# Patient Record
Sex: MALE | Race: BLACK OR AFRICAN AMERICAN | NOT HISPANIC OR LATINO | Employment: FULL TIME | URBAN - METROPOLITAN AREA
[De-identification: names, ages, dates, MRNs, and addresses within clinical notes are randomized per-mention and may not be internally consistent; named-entity substitution may affect disease eponyms.]

---

## 2020-01-07 ENCOUNTER — OFFICE VISIT (OUTPATIENT)
Dept: UROLOGY | Facility: CLINIC | Age: 65
End: 2020-01-07
Payer: COMMERCIAL

## 2020-01-07 VITALS
RESPIRATION RATE: 19 BRPM | WEIGHT: 148 LBS | DIASTOLIC BLOOD PRESSURE: 83 MMHG | BODY MASS INDEX: 23.78 KG/M2 | HEART RATE: 70 BPM | HEIGHT: 66 IN | SYSTOLIC BLOOD PRESSURE: 154 MMHG

## 2020-01-07 DIAGNOSIS — R97.20 ELEVATED PSA: Primary | ICD-10-CM

## 2020-01-07 DIAGNOSIS — N40.0 BPH WITHOUT OBSTRUCTION/LOWER URINARY TRACT SYMPTOMS: ICD-10-CM

## 2020-01-07 PROCEDURE — 99204 PR OFFICE/OUTPT VISIT, NEW, LEVL IV, 45-59 MIN: ICD-10-PCS | Mod: 25,S$GLB,, | Performed by: NURSE PRACTITIONER

## 2020-01-07 PROCEDURE — 36415 PR COLLECTION VENOUS BLOOD,VENIPUNCTURE: ICD-10-PCS | Mod: S$GLB,,, | Performed by: NURSE PRACTITIONER

## 2020-01-07 PROCEDURE — 99204 OFFICE O/P NEW MOD 45 MIN: CPT | Mod: 25,S$GLB,, | Performed by: NURSE PRACTITIONER

## 2020-01-07 PROCEDURE — 36415 COLL VENOUS BLD VENIPUNCTURE: CPT | Mod: S$GLB,,, | Performed by: NURSE PRACTITIONER

## 2020-01-07 NOTE — PROGRESS NOTES
Chief Complaint:   Chief Complaint   Patient presents with    Other     Elevated [facility]       HPI:  64-year-old  male [facility].  Upon review of medical record PSA 11.41 noted recently, previous value of 1.98 in March 2006.  Patient states that he did not follow up with primary care for quite some time.  He denies any lower urinary tract symptoms such as urinary hesitancy, urgency, frequency, incomplete emptying, or burning with urination.  He is not currently on any prostate medications, has never taken any medications in the past such as alpha blockers.  He denies a family history of prostate cancer.  He declines TERESA today as one was recently performed by his primary care provider.  Upon review of medical records the prostate was enlarged, without any palpable nodules, without tenderness.    Of note, patient did report intermittent episodes of dizziness that he experiences at work that he attributes to dehydration as he does not eat breakfast in the morning. No current symptoms so patient educated and instructed to follow-up with PCP for management.    Allergies:  Patient has no known allergies.    Medications:  currently has no medications in their medication list.    Review of Systems:  General: No fever, chills, vision changes, dizziness, weakness, fatigue, unexplained weight loss, confusion, or mood swings.  Skin: No rashes, itching, or changes in color/texture of skin.  Chest: Denies CARPENTER, cyanosis, wheezing, cough, and sputum production.  Abdomen: Appetite fine. Denies diarrhea, abdominal pain, hematemesis, or blood in stool.  Musculoskeletal: No joint stiffness or swelling. Denies back pain.  : As above.  All other review of systems negative.    PMH:   has a past medical history of Anemia, Diabetes mellitus, and Hyperlipidemia.    PSH:   has a past surgical history that includes Finger amputation.    FamHx: family history is not on  "file.    SocHx:  reports that he has never smoked. He has never used smokeless tobacco. He reports that he drank alcohol. He reports that he has current or past drug history. Drugs: "Crack" cocaine and Marijuana.      Physical Exam:  Vitals:    01/07/20 1551   BP: (!) 154/83   Pulse: 70   Resp: 19     General: AAOx3, no apparent distress, no deformities  Neck: supple, no masses, normal thyroid  Lungs: normal inspiration, no use of accessory muscles  Heart: regular rate and rhythm, no arrhythmias  Abdomen: soft, NT, ND, no masses, no hernias, no hepatosplenomegaly  Lymphatic: no unusually enlarged or tender lymph nodes  Skin: warm and dry, no jaundice.  Ext: without edema or deformity.  : declined TERESA    Labs/Studies: PSA levels reviewed as above    Impression/Plan:   Elevated PSA  Comments:  PSA 11.41 on labs noted from PCP (previous value 1.98 in 3/2006- no follow up for some time), TERESA benign from PCP (declined repeat), PSA drawn & will call   Orders:  -     PSA, total and free    BPH without obstruction/lower urinary tract symptoms  Comments:  prostate enlargement noted on TERESA by PCP, pt denies LUTS, no prostate meds         Follow up in about 3 months (around 4/7/2020).  "

## 2020-01-08 LAB
PROSTATE SPECIFIC ANTIGEN, TOTAL: 14.2 NG/ML (ref 0.1–4)
PSA FREE MFR SERPL: 6.6 %
PSA FREE SERPL-MCNC: 0.94 NG/ML

## 2020-01-09 ENCOUNTER — TELEPHONE (OUTPATIENT)
Dept: UROLOGY | Facility: CLINIC | Age: 65
End: 2020-01-09

## 2020-01-09 DIAGNOSIS — R97.20 ELEVATED PSA: Primary | ICD-10-CM

## 2020-01-09 NOTE — TELEPHONE ENCOUNTER
PSA 14.20 6.6%free    Please call patient and let him know that I am recommending prostate biopsy as we discussed at his appointment

## 2020-01-14 ENCOUNTER — TELEPHONE (OUTPATIENT)
Dept: UROLOGY | Facility: CLINIC | Age: 65
End: 2020-01-14

## 2020-01-14 NOTE — TELEPHONE ENCOUNTER
----- Message from Delphine Newsome sent at 1/13/2020  1:18 PM CST -----  I called pt to set up bx for pt, we set it up but he told me that no one called  Him with his bloodwork results.

## 2020-01-24 ENCOUNTER — CLINICAL SUPPORT (OUTPATIENT)
Dept: UROLOGY | Facility: CLINIC | Age: 65
End: 2020-01-24
Payer: COMMERCIAL

## 2020-01-24 DIAGNOSIS — R97.20 ELEVATED PSA: Primary | ICD-10-CM

## 2020-02-03 ENCOUNTER — PROCEDURE VISIT (OUTPATIENT)
Dept: UROLOGY | Facility: CLINIC | Age: 65
End: 2020-02-03
Payer: COMMERCIAL

## 2020-02-03 VITALS
DIASTOLIC BLOOD PRESSURE: 55 MMHG | HEART RATE: 66 BPM | BODY MASS INDEX: 22.82 KG/M2 | WEIGHT: 142 LBS | SYSTOLIC BLOOD PRESSURE: 103 MMHG | HEIGHT: 66 IN | RESPIRATION RATE: 16 BRPM | OXYGEN SATURATION: 97 %

## 2020-02-03 DIAGNOSIS — R97.20 ELEVATED PSA: ICD-10-CM

## 2020-02-03 PROCEDURE — 76872 US TRANSRECTAL: CPT | Mod: 26,S$GLB,, | Performed by: SPECIALIST

## 2020-02-03 PROCEDURE — 96372 PR INJECTION,THERAP/PROPH/DIAG2ST, IM OR SUBCUT: ICD-10-PCS | Mod: 59,S$GLB,, | Performed by: SPECIALIST

## 2020-02-03 PROCEDURE — 55700 TRUS: ICD-10-PCS | Mod: S$GLB,,, | Performed by: SPECIALIST

## 2020-02-03 PROCEDURE — 76872 TRUS: ICD-10-PCS | Mod: 26,S$GLB,, | Performed by: SPECIALIST

## 2020-02-03 PROCEDURE — 96372 THER/PROPH/DIAG INJ SC/IM: CPT | Mod: 59,S$GLB,, | Performed by: SPECIALIST

## 2020-02-03 PROCEDURE — 55700 TRUS: CPT | Mod: S$GLB,,, | Performed by: SPECIALIST

## 2020-02-03 RX ORDER — MEPERIDINE HYDROCHLORIDE 25 MG/ML
25 INJECTION INTRAMUSCULAR; INTRAVENOUS; SUBCUTANEOUS
Status: COMPLETED | OUTPATIENT
Start: 2020-02-03 | End: 2020-02-03

## 2020-02-03 RX ORDER — PROMETHAZINE HYDROCHLORIDE 25 MG/ML
25 INJECTION, SOLUTION INTRAMUSCULAR; INTRAVENOUS
Status: COMPLETED | OUTPATIENT
Start: 2020-02-03 | End: 2020-02-03

## 2020-02-03 RX ORDER — CEFTRIAXONE 1 G/1
1 INJECTION, POWDER, FOR SOLUTION INTRAMUSCULAR; INTRAVENOUS
Status: COMPLETED | OUTPATIENT
Start: 2020-02-03 | End: 2020-02-03

## 2020-02-03 RX ORDER — DIAZEPAM 10 MG/1
10 TABLET ORAL
Status: CANCELLED | OUTPATIENT
Start: 2020-02-03 | End: 2020-02-03

## 2020-02-03 RX ORDER — DIAZEPAM 10 MG/1
10 TABLET ORAL
Status: COMPLETED | OUTPATIENT
Start: 2020-02-03 | End: 2020-02-03

## 2020-02-03 RX ADMIN — MEPERIDINE HYDROCHLORIDE 25 MG: 25 INJECTION INTRAMUSCULAR; INTRAVENOUS; SUBCUTANEOUS at 11:02

## 2020-02-03 RX ADMIN — CEFTRIAXONE 1 G: 1 INJECTION, POWDER, FOR SOLUTION INTRAMUSCULAR; INTRAVENOUS at 11:02

## 2020-02-03 RX ADMIN — DIAZEPAM 10 MG: 10 TABLET ORAL at 11:02

## 2020-02-03 RX ADMIN — PROMETHAZINE HYDROCHLORIDE 25 MG: 25 INJECTION, SOLUTION INTRAMUSCULAR; INTRAVENOUS at 11:02

## 2020-02-03 NOTE — PATIENT INSTRUCTIONS

## 2020-02-03 NOTE — PROCEDURES
"TRUS  Date/Time: 2/3/2020 11:20 AM  Performed by: Anthony Figueroa MD  Authorized by: Anthony Figueroa MD     Consent Done?:  Yes (Written)  Time out: Immediately prior to procedure a "time out" was called to verify the correct patient, procedure, equipment, support staff and site/side marked as required.    Indications: Elevated PSA    Preparation: Patient was prepped and draped in usual sterile fashion    Position:  Left lateral  Anesthesia:  10cc's 1% Lidocaine  Patient sedated: Yes    Sedation:  Other  Prostate Size:  50.02 g  Lesions:: Yes         Type:  Hypoechoic  Left Base Biopsies: 2  Left Mid Biopsies: 2  Left [place]ushan Biopsies: 2  Right Base Biopsies: 2  Right Mid Biopsies: 2  Right Shushan Biopsies: 2  Transitional zone: No    Total Biopsies:  12    Patient tolerance:  Patient tolerated the procedure well with no immediate complications     Patient was given post biopsy instructions.  He was instructed of the possibility of blood in the urine blood in the stool blood in the semen for up to 6-8 weeks.  He has been instructed to watch out for any flu-like symptoms malaise fatigue etc.  Patient has been notified to give us a call if he has any temperatures.  We should see him in clinic in 2 weeks for post biopsy results.        "

## 2020-02-17 ENCOUNTER — TELEPHONE (OUTPATIENT)
Dept: UROLOGY | Facility: CLINIC | Age: 65
End: 2020-02-17

## 2020-02-17 NOTE — TELEPHONE ENCOUNTER
----- Message from Blake Dubose sent at 2/17/2020  9:42 AM CST -----  Contact: Pt  Please call Kemal regarding his reimbursement paperwork [phone] (home).

## 2020-02-28 ENCOUNTER — OFFICE VISIT (OUTPATIENT)
Dept: UROLOGY | Facility: CLINIC | Age: 65
End: 2020-02-28
Payer: COMMERCIAL

## 2020-02-28 VITALS — WEIGHT: 142 LBS | RESPIRATION RATE: 18 BRPM | BODY MASS INDEX: 22.82 KG/M2 | HEIGHT: 66 IN

## 2020-02-28 DIAGNOSIS — C61 PROSTATE CANCER: Primary | ICD-10-CM

## 2020-02-28 PROCEDURE — 99213 PR OFFICE/OUTPT VISIT, EST, LEVL III, 20-29 MIN: ICD-10-PCS | Mod: S$GLB,,, | Performed by: SPECIALIST

## 2020-02-28 PROCEDURE — 99213 OFFICE O/P EST LOW 20 MIN: CPT | Mod: S$GLB,,, | Performed by: SPECIALIST

## 2020-02-28 NOTE — PROGRESS NOTES
"Subjective:       Patient ID: Kemal Chu is a 65 y.o. male.    Chief Complaint: Other (follow up for prostate biopsy results)      HPI:  65-year-old man who underwent a needle biopsy for elevated PSA done about 2 weeks ago.  He is here to discuss his results.    Denies any side effects from the biopsies no blood in the urine or blood in the semen no blood in the stool.  Voids to completion denies any burning with urination.  Empties his bladder very well.    Past Medical History:   Past Medical History:   Diagnosis Date    Anemia     Diabetes mellitus     Hyperlipidemia        Past Surgical Historical:   Past Surgical History:   Procedure Laterality Date    FINGER AMPUTATION          Medications:      Past Social History:   Social History     Socioeconomic History    Marital status:      Spouse name: Not on file    Number of children: Not on file    Years of education: Not on file    Highest education level: Not on file   Occupational History    Occupation:    Social Needs    Financial resource strain: Not on file    Food insecurity:     Worry: Not on file     Inability: Not on file    Transportation needs:     Medical: Not on file     Non-medical: Not on file   Tobacco Use    Smoking status: Never Smoker    Smokeless tobacco: Never Used   Substance and Sexual Activity    Alcohol use: Not Currently     Frequency: Never     Comment: quit     Drug use: Not Currently     Types: "Crack" cocaine, Marijuana     Comment: quit 2004    Sexual activity: Not on file   Lifestyle    Physical activity:     Days per week: Not on file     Minutes per session: Not on file    Stress: Not on file   Relationships    Social connections:     Talks on phone: Not on file     Gets together: Not on file     Attends Sabianism service: Not on file     Active member of club or organization: Not on file     Attends meetings of clubs or organizations: Not on file     Relationship status: Not on file "   Other Topics Concern    Not on file   Social History Narrative    Not on file       Allergies: Review of patient's allergies indicates:  No Known Allergies     Family History: History reviewed. No pertinent family history.     Review of Systems:   systems reviewed and notable for prostate cancer  All other systems were reviewed Neg except as stated in the HPI    Physical Exam:  General: A&Ox3. No apparent distress. No deformities.  Neck: No masses. Normal thyroid.  Lungs: normal inspiration. No use of accessory muscles.  Heart: normal pulse. No arrhythmias.  Abdomen: Soft. NT. ND. No masses. No hernias. No hepatosplenomegaly.  Lymphatic: Neck and groin nodes negative.  Skin: The skin is warm and dry. No jaundice.  Neurology: Cranial nerves 2-12 crossly intact, no focal weaknesses, no sensation deficits, no motor deficits  Ext: No clubbing, cyanosis or edema.  :  Deferred    Pathology review:  10 cores reported as benign prostatic tissue.  The left base was reported with Ranjit 3 + 3 equals 6  0.5 mm focus 3% of the core and the left lateral mid was reported a Ranjit 3 + 3 equals 6  0.5 mm focus and 4% of the core    Assessment/Plan:       65-year-old man with newly diagnosed low risk low volume adenocarcinoma of the prostate initial PSA 14.20 ng/mL.    1.  His calculated PSA density is 0 0.28 which is of course greater than 0.15.  The as up with his diagnosis.  2.  I talked to him about the natural progression of prostate cancer.  At this point he has low volume low risk disease.  Two cores were reported positive Ranjit 3 + 3 equals 6 with 0.5 mm foci in the the core and 3% on 4% respectively.  He has elected to undergo active surveillance after we explored all the treatment options.  3.  Return to clinic in 6 months for PSA check and he will need a repeat biopsy 1 year from his previous biopsy    Problem List Items Addressed This Visit        Oncology    Prostate cancer - Primary

## 2021-03-26 ENCOUNTER — OFFICE VISIT (OUTPATIENT)
Dept: UROLOGY | Facility: CLINIC | Age: 66
End: 2021-03-26
Payer: OTHER GOVERNMENT

## 2021-03-26 VITALS — DIASTOLIC BLOOD PRESSURE: 79 MMHG | HEART RATE: 67 BPM | SYSTOLIC BLOOD PRESSURE: 133 MMHG

## 2021-03-26 DIAGNOSIS — C61 PROSTATE CANCER: Primary | ICD-10-CM

## 2021-03-26 DIAGNOSIS — N40.0 BPH WITHOUT OBSTRUCTION/LOWER URINARY TRACT SYMPTOMS: ICD-10-CM

## 2021-03-26 LAB — PSA, DIAGNOSTIC: 17.8 NG/ML (ref 0–4)

## 2021-03-26 PROCEDURE — 99213 PR OFFICE/OUTPT VISIT, EST, LEVL III, 20-29 MIN: ICD-10-PCS | Mod: S$GLB,,, | Performed by: NURSE PRACTITIONER

## 2021-03-26 PROCEDURE — 36415 COLL VENOUS BLD VENIPUNCTURE: CPT | Mod: S$GLB,,, | Performed by: NURSE PRACTITIONER

## 2021-03-26 PROCEDURE — 36415 PR COLLECTION VENOUS BLOOD,VENIPUNCTURE: ICD-10-PCS | Mod: S$GLB,,, | Performed by: NURSE PRACTITIONER

## 2021-03-26 PROCEDURE — 99213 OFFICE O/P EST LOW 20 MIN: CPT | Mod: S$GLB,,, | Performed by: NURSE PRACTITIONER

## 2021-03-26 RX ORDER — NAPROXEN SODIUM 220 MG/1
TABLET, FILM COATED ORAL
COMMUNITY
Start: 2021-01-13 | End: 2022-12-09 | Stop reason: SDUPTHER

## 2021-03-26 RX ORDER — METFORMIN HYDROCHLORIDE 500 MG/1
TABLET, EXTENDED RELEASE ORAL
COMMUNITY
Start: 2021-01-13

## 2021-03-26 RX ORDER — LISINOPRIL 2.5 MG/1
TABLET ORAL
COMMUNITY
Start: 2021-01-12 | End: 2022-12-09

## 2021-04-20 ENCOUNTER — CLINICAL SUPPORT (OUTPATIENT)
Dept: UROLOGY | Facility: CLINIC | Age: 66
End: 2021-04-20
Payer: OTHER GOVERNMENT

## 2021-04-28 ENCOUNTER — TELEPHONE (OUTPATIENT)
Dept: UROLOGY | Facility: CLINIC | Age: 66
End: 2021-04-28

## 2021-04-29 ENCOUNTER — PROCEDURE VISIT (OUTPATIENT)
Dept: UROLOGY | Facility: CLINIC | Age: 66
End: 2021-04-29
Payer: OTHER GOVERNMENT

## 2021-04-29 VITALS
RESPIRATION RATE: 18 BRPM | WEIGHT: 125.63 LBS | DIASTOLIC BLOOD PRESSURE: 65 MMHG | HEART RATE: 87 BPM | HEIGHT: 66 IN | SYSTOLIC BLOOD PRESSURE: 145 MMHG | BODY MASS INDEX: 20.19 KG/M2 | OXYGEN SATURATION: 98 %

## 2021-04-29 DIAGNOSIS — C61 PROSTATE CANCER: Primary | ICD-10-CM

## 2021-04-29 PROCEDURE — 55700 PR BIOPSY OF PROSTATE,NEEDLE/PUNCH: CPT | Mod: S$GLB,,, | Performed by: SPECIALIST

## 2021-04-29 PROCEDURE — 76872 PR US TRANSRECTAL: ICD-10-PCS | Mod: 26,S$GLB,, | Performed by: SPECIALIST

## 2021-04-29 PROCEDURE — 96372 THER/PROPH/DIAG INJ SC/IM: CPT | Mod: 59,S$GLB,, | Performed by: SPECIALIST

## 2021-04-29 PROCEDURE — 96372 PR INJECTION,THERAP/PROPH/DIAG2ST, IM OR SUBCUT: ICD-10-PCS | Mod: 59,S$GLB,, | Performed by: SPECIALIST

## 2021-04-29 PROCEDURE — 76872 US TRANSRECTAL: CPT | Mod: 26,S$GLB,, | Performed by: SPECIALIST

## 2021-04-29 PROCEDURE — 55700 PR BIOPSY OF PROSTATE,NEEDLE/PUNCH: ICD-10-PCS | Mod: S$GLB,,, | Performed by: SPECIALIST

## 2021-04-29 RX ORDER — CEFTRIAXONE 500 MG/1
1000 INJECTION, POWDER, FOR SOLUTION INTRAMUSCULAR; INTRAVENOUS
Status: COMPLETED | OUTPATIENT
Start: 2021-04-29 | End: 2021-04-29

## 2021-04-29 RX ORDER — MEPERIDINE HYDROCHLORIDE 25 MG/ML
25 INJECTION INTRAMUSCULAR; INTRAVENOUS; SUBCUTANEOUS
Status: CANCELLED | OUTPATIENT
Start: 2021-04-29 | End: 2021-04-29

## 2021-04-29 RX ORDER — PROMETHAZINE HYDROCHLORIDE 25 MG/ML
25 INJECTION, SOLUTION INTRAMUSCULAR; INTRAVENOUS
Status: CANCELLED | OUTPATIENT
Start: 2021-04-29 | End: 2021-04-29

## 2021-04-29 RX ORDER — GENTAMICIN SULFATE 40 MG/ML
80 INJECTION, SOLUTION INTRAMUSCULAR; INTRAVENOUS
Status: COMPLETED | OUTPATIENT
Start: 2021-04-29 | End: 2021-04-29

## 2021-04-29 RX ORDER — DIAZEPAM 10 MG/1
10 TABLET ORAL
Status: COMPLETED | OUTPATIENT
Start: 2021-04-29 | End: 2021-04-29

## 2021-04-29 RX ADMIN — CEFTRIAXONE 1000 MG: 500 INJECTION, POWDER, FOR SOLUTION INTRAMUSCULAR; INTRAVENOUS at 11:04

## 2021-04-29 RX ADMIN — GENTAMICIN SULFATE 80 MG: 40 INJECTION, SOLUTION INTRAMUSCULAR; INTRAVENOUS at 11:04

## 2021-04-29 RX ADMIN — DIAZEPAM 10 MG: 10 TABLET ORAL at 11:04

## 2021-05-13 ENCOUNTER — OFFICE VISIT (OUTPATIENT)
Dept: UROLOGY | Facility: CLINIC | Age: 66
End: 2021-05-13
Payer: OTHER GOVERNMENT

## 2021-05-13 VITALS
BODY MASS INDEX: 20.09 KG/M2 | HEIGHT: 66 IN | RESPIRATION RATE: 18 BRPM | DIASTOLIC BLOOD PRESSURE: 62 MMHG | WEIGHT: 125 LBS | HEART RATE: 68 BPM | SYSTOLIC BLOOD PRESSURE: 118 MMHG

## 2021-05-13 DIAGNOSIS — C61 PROSTATE CANCER: Primary | ICD-10-CM

## 2021-05-13 DIAGNOSIS — R97.20 ELEVATED PSA: ICD-10-CM

## 2021-05-13 PROCEDURE — 99213 OFFICE O/P EST LOW 20 MIN: CPT | Mod: S$GLB,,, | Performed by: SPECIALIST

## 2021-05-13 PROCEDURE — 99213 PR OFFICE/OUTPT VISIT, EST, LEVL III, 20-29 MIN: ICD-10-PCS | Mod: S$GLB,,, | Performed by: SPECIALIST

## 2022-10-04 ENCOUNTER — OFFICE VISIT (OUTPATIENT)
Dept: UROLOGY | Facility: CLINIC | Age: 67
End: 2022-10-04
Payer: OTHER GOVERNMENT

## 2022-10-04 VITALS
HEIGHT: 66 IN | HEART RATE: 61 BPM | WEIGHT: 132 LBS | TEMPERATURE: 98 F | DIASTOLIC BLOOD PRESSURE: 75 MMHG | RESPIRATION RATE: 18 BRPM | SYSTOLIC BLOOD PRESSURE: 130 MMHG | BODY MASS INDEX: 21.21 KG/M2

## 2022-10-04 DIAGNOSIS — C61 PROSTATE CANCER: Primary | ICD-10-CM

## 2022-10-04 PROCEDURE — 99215 OFFICE O/P EST HI 40 MIN: CPT | Mod: S$GLB,,, | Performed by: UROLOGY

## 2022-10-04 PROCEDURE — 99215 PR OFFICE/OUTPT VISIT, EST, LEVL V, 40-54 MIN: ICD-10-PCS | Mod: S$GLB,,, | Performed by: UROLOGY

## 2022-10-04 NOTE — PROGRESS NOTES
"Subjective:       Patient ID: Kemal Chu is a 67 y.o. male.    Chief Complaint: Prostate Cancer (Recent PSA 9/19/22 20.28)      HPI:  67-year-old male with prostate cancer initially low volume Ranjit 6 however he had a repeat PSA while on active surveillance as well as a repeat biopsy which revealed higher volume Ranjit 7 he was encouraged to proceed with treatment however he declined and has been lost to follow-up for nearly a year and a half he is here today his PSA is now 20 with a high volume Ranjit prostate biopsy 7 urine half ago    Past Medical History:   Past Medical History:   Diagnosis Date    Anemia     Diabetes mellitus     Hyperlipidemia     Hypertension     Prostate cancer        Past Surgical Historical:   Past Surgical History:   Procedure Laterality Date    FINGER AMPUTATION          Medications:   Medication List with Changes/Refills   Current Medications    ASPIRIN 81 MG CHEW        LISINOPRIL (PRINIVIL,ZESTRIL) 2.5 MG TABLET        METFORMIN (GLUCOPHAGE-XR) 500 MG ER 24HR TABLET            Past Social History:   Social History     Socioeconomic History    Marital status:    Occupational History    Occupation:    Tobacco Use    Smoking status: Never    Smokeless tobacco: Never   Substance and Sexual Activity    Alcohol use: Not Currently     Comment: quit     Drug use: Not Currently     Types: "Crack" cocaine, Marijuana     Comment: quit 2004       Allergies: Review of patient's allergies indicates:  No Known Allergies     Family History:   Family History   Problem Relation Age of Onset    No Known Problems Father     No Known Problems Mother         Review of Systems:  Review of Systems   Constitutional:  Negative for activity change and appetite change.   HENT:  Negative for congestion and dental problem.    Eyes:  Negative for visual disturbance.   Respiratory:  Negative for chest tightness and shortness of breath.    Cardiovascular:  Negative for chest pain. "   Gastrointestinal:  Negative for abdominal distention and abdominal pain.   Endocrine: Negative for polyuria.   Genitourinary:  Negative for difficulty urinating, dysuria, flank pain, frequency, hematuria, penile discharge, penile pain, penile swelling, scrotal swelling, testicular pain and urgency.   Musculoskeletal:  Negative for back pain and neck pain.   Skin:  Negative for color change.   Allergic/Immunologic: Positive for immunocompromised state.   Neurological:  Negative for dizziness.   Hematological:  Negative for adenopathy.   Psychiatric/Behavioral:  Negative for agitation, behavioral problems and confusion.      Physical Exam:  Physical Exam  Constitutional:       Appearance: He is well-developed.   HENT:      Head: Normocephalic and atraumatic.   Eyes:      Pupils: Pupils are equal, round, and reactive to light.   Cardiovascular:      Rate and Rhythm: Normal rate and regular rhythm.      Heart sounds: Normal heart sounds.   Pulmonary:      Effort: Pulmonary effort is normal. No respiratory distress.      Breath sounds: Normal breath sounds. No wheezing or rales.   Abdominal:      General: Bowel sounds are normal. There is no distension.      Palpations: Abdomen is soft.      Tenderness: There is no abdominal tenderness. There is no guarding or rebound.   Genitourinary:     Penis: Normal. No tenderness.       Prostate: Normal.   Musculoskeletal:         General: Normal range of motion.       Assessment/Plan:       Problem List Items Addressed This Visit          Oncology    Prostate cancer - Primary    Relevant Orders    Transrectal Ultrasound w/ Biopsy            Prostate cancer:   We had a very long and vishal discussion about the significance of his prostate cancer how if left unchecked or untreated he will likely die of this disease.  Patient appears to have an understanding of this but continues to seem not particularly alarmed and states he mostly does not want to do any thing to treated because he  does not feel like it or want to.  Again I told him in very plain terms that he is wetting the risk of choosing the dive prostate cancer patient has agreed to repeat a biopsy since it has been over year and a half since his last 1 in certainly we need to know what is going on now.  Will proceed with biopsy and hopefully curative treatment if this is possible

## 2022-10-18 ENCOUNTER — TELEPHONE (OUTPATIENT)
Dept: UROLOGY | Facility: CLINIC | Age: 67
End: 2022-10-18
Payer: OTHER GOVERNMENT

## 2022-10-24 ENCOUNTER — TELEPHONE (OUTPATIENT)
Dept: UROLOGY | Facility: CLINIC | Age: 67
End: 2022-10-24
Payer: OTHER GOVERNMENT

## 2022-10-24 DIAGNOSIS — R97.20 ELEVATED PROSTATE SPECIFIC ANTIGEN (PSA): Primary | ICD-10-CM

## 2022-11-22 DIAGNOSIS — C61 PROSTATE CANCER: Primary | ICD-10-CM

## 2022-11-22 RX ORDER — DIAZEPAM 10 MG/1
10 TABLET ORAL ONCE
Qty: 1 TABLET | Refills: 0 | Status: SHIPPED | OUTPATIENT
Start: 2022-11-22 | End: 2022-12-09

## 2022-11-22 RX ORDER — CIPROFLOXACIN 500 MG/1
500 TABLET ORAL 2 TIMES DAILY
Qty: 8 TABLET | Refills: 0 | Status: SHIPPED | OUTPATIENT
Start: 2022-11-22 | End: 2022-11-26

## 2022-11-23 ENCOUNTER — CLINICAL SUPPORT (OUTPATIENT)
Dept: UROLOGY | Facility: CLINIC | Age: 67
End: 2022-11-23
Payer: OTHER GOVERNMENT

## 2022-11-23 DIAGNOSIS — R97.20 ELEVATED PROSTATE SPECIFIC ANTIGEN (PSA): ICD-10-CM

## 2022-11-23 NOTE — PROGRESS NOTES
Pt here for prostate biopsy education.  All pre/post op information reviewed and discussed.  Questions answered and pt acknowledges understanding.  Consents signed. Cipro and Valium scripts given to patient. Encouraged to call with questions or concerns.

## 2022-12-09 ENCOUNTER — PROCEDURE VISIT (OUTPATIENT)
Dept: UROLOGY | Facility: CLINIC | Age: 67
End: 2022-12-09
Payer: OTHER GOVERNMENT

## 2022-12-09 VITALS
WEIGHT: 130 LBS | HEIGHT: 66 IN | HEART RATE: 64 BPM | OXYGEN SATURATION: 100 % | RESPIRATION RATE: 20 BRPM | DIASTOLIC BLOOD PRESSURE: 57 MMHG | SYSTOLIC BLOOD PRESSURE: 123 MMHG | BODY MASS INDEX: 20.89 KG/M2

## 2022-12-09 DIAGNOSIS — C61 PROSTATE CANCER: ICD-10-CM

## 2022-12-09 PROCEDURE — 76942 ECHO GUIDE FOR BIOPSY: CPT | Mod: S$GLB,,, | Performed by: UROLOGY

## 2022-12-09 PROCEDURE — 76942 TRANSRECTAL ULTRASOUND W/ BIOPSY: ICD-10-PCS | Mod: S$GLB,,, | Performed by: UROLOGY

## 2022-12-09 PROCEDURE — 55700 TRANSRECTAL ULTRASOUND W/ BIOPSY: CPT | Mod: S$GLB,,, | Performed by: UROLOGY

## 2022-12-09 PROCEDURE — 55700 TRANSRECTAL ULTRASOUND W/ BIOPSY: ICD-10-PCS | Mod: S$GLB,,, | Performed by: UROLOGY

## 2022-12-09 RX ORDER — NAPROXEN SODIUM 220 MG/1
81 TABLET, FILM COATED ORAL
COMMUNITY
Start: 2022-05-09

## 2022-12-09 NOTE — PROCEDURES
"Transrectal Ultrasound w/ Biopsy    Date/Time: 12/9/2022 10:00 AM  Performed by: Chaparro Reyes MD  Authorized by: Chaparro Reyes MD     Consent Done?:  Yes (Written)  Time out: Immediately prior to procedure a "time out" was called to verify the correct patient, procedure, equipment, support staff and site/side marked as required.    Indications: Elevated PSA    Position:  Left lateral  Anesthesia:  Pudendal nerve block  Patient sedated: No    Prostate Size:  32g  Left Base Biopsies: 2  Left Mid Biopsies: 2  Left [place] Biopsies: 2  Right Base Biopsies: 2  Right Mid Biopsies: 2  Right [place] Biopsies: 2  Total Biopsies:  12    Patient tolerance:  Patient tolerated the procedure well with no immediate complications     Patient was brought to the procedure room placed on the table in left lateral decubitus position lidocaine jelly was instilled into the rectum the ultrasound probe was advanced to level of prostate and a total of 10 cc of lidocaine was injected into the bilateral sharif prostatic fossa.  A standard 12 core prostate biopsy was obtained patient tolerated the procedure well there were no complications  "

## 2022-12-09 NOTE — PATIENT INSTRUCTIONS
NO STRENUOUS ACTIVITY FOR 3 DAYS  NO SEXUAL INTERCOURSE FOR 7 DAYS  YOU MAY NOTICE BLOOD IN URINE OR SEMEN FOR SEVERAL DAYS                What to Expect After a Prostate Biopsy    Please be sure to finish your pre-procedure antibiotics as instructed.    You may have mild bleeding from the rectum or urine for about 1 week to 1 month, or in your ejaculate for several months. This bleeding is normal and expected, and it will stop. You may have mild discomfort in your rectal or urethral area for 24-48 hours.    You cannot do any strenuous lifting, straining, or exercising for 24 hours. You may return to full activity the day after the biopsy.    You may continue to take all your regular medications after the procedure except for the blood thinners.    You may resume all blood-thinning medications once you no longer see any bleeding or whenever your physician prescribing the medication says it is all right to do so. You may take Tylenol if you have a fever and your temperature is less than 100° F or if you have some discomfort.    You will receive a call from the Urology Department at [facility] with the results of your prostate biopsy within one week.    Signs and Symptoms to Report    Call your [facility] urologist at [phone] if you develop any of the following:  Temperature greater than 101°  F  Inability to urinate  A large amount of bleeding from the rectum or in the urine  Persistent or severe pain    After hours or on weekends, you may reach a urology resident on call at this number: [phone].

## 2022-12-15 ENCOUNTER — OFFICE VISIT (OUTPATIENT)
Dept: UROLOGY | Facility: CLINIC | Age: 67
End: 2022-12-15
Payer: OTHER GOVERNMENT

## 2022-12-15 DIAGNOSIS — C61 PROSTATE CANCER: Primary | ICD-10-CM

## 2022-12-15 PROCEDURE — 99214 PR OFFICE/OUTPT VISIT, EST, LEVL IV, 30-39 MIN: ICD-10-PCS | Mod: S$GLB,,, | Performed by: UROLOGY

## 2022-12-15 PROCEDURE — 99214 OFFICE O/P EST MOD 30 MIN: CPT | Mod: S$GLB,,, | Performed by: UROLOGY

## 2022-12-15 NOTE — PROGRESS NOTES
"Subjective:       Patient ID: Kemal Chu is a 67 y.o. male.    Chief Complaint: 1 week bx results      HPI:  67-year-old male with known prostate cancer is followed with active surveillance he had an anniversary biopsy which revealed progression of disease see below.  He declined any intervention and was then lost to follow-up came back with a PSA around 20 and agreed to a repeat biopsy interestingly this only showed 2 cores of [place] 6 prostate cancer    There is evidence of progressive disease.  Patient has 3 cores of Ranjit 3+4=7 grade group 2, 5 mm focus 19% of the core in the left base, 9 mm focus, 24% of the core in the left apex and 3 mm focus, 10% of the core in the left lateral mid.  He also has Ranjit 3+3=6 grade group 1, 1 mm focus, 8% of the core in the left lateral base, 3 mm focus in 17% of the core in the left lateral apex, 1.5 mm focus and 15% of the core in the right lateral base.     Past Medical History:   Past Medical History:   Diagnosis Date    Anemia     Diabetes mellitus     Hyperlipidemia     Hypertension     Prostate cancer        Past Surgical Historical:   Past Surgical History:   Procedure Laterality Date    FINGER AMPUTATION          Medications:   Medication List with Changes/Refills   Current Medications    ASPIRIN 81 MG CHEW    81 mg.    METFORMIN (GLUCOPHAGE-XR) 500 MG ER 24HR TABLET            Past Social History:   Social History     Socioeconomic History    Marital status:    Occupational History    Occupation:    Tobacco Use    Smoking status: Never    Smokeless tobacco: Never   Substance and Sexual Activity    Alcohol use: Not Currently     Comment: quit     Drug use: Not Currently     Types: "Crack" cocaine, Marijuana     Comment: quit 2004       Allergies: Review of patient's allergies indicates:  No Known Allergies     Family History:   Family History   Problem Relation Age of Onset    No Known Problems Father     No Known Problems Mother     "     Review of Systems:  Review of Systems    Physical Exam:  Physical Exam    Assessment/Plan:       Problem List Items Addressed This Visit          Oncology    Prostate cancer - Primary            67-year-old male with Ranjit 7 prostate cancer I have again reinforced that I feel that the patient's seek curative therapy despite only having 2 cores [place] 6 on this most recent biopsy how long vishal discussion with him and the patient stated he is not interested at all in any surgical or radiation intervention ask the patient to think longer about it call us if he changes mind otherwise we will repeat his PSA in 3 months

## 2023-06-19 ENCOUNTER — CLINICAL SUPPORT (OUTPATIENT)
Dept: UROLOGY | Facility: CLINIC | Age: 68
End: 2023-06-19
Payer: OTHER GOVERNMENT

## 2023-06-19 DIAGNOSIS — C61 PROSTATE CANCER: Primary | ICD-10-CM

## 2023-06-19 LAB — PSA, DIAGNOSTIC: 27.9 NG/ML (ref 0.1–4)

## 2023-06-19 PROCEDURE — 36415 COLL VENOUS BLD VENIPUNCTURE: CPT | Mod: S$GLB,,, | Performed by: UROLOGY

## 2023-06-19 PROCEDURE — 36415 PR COLLECTION VENOUS BLOOD,VENIPUNCTURE: ICD-10-PCS | Mod: S$GLB,,, | Performed by: UROLOGY

## 2023-06-19 NOTE — PROGRESS NOTES
PSA level collected from left AC with 22g needle using aseptic technique. Patient tolerated well.

## 2023-06-22 ENCOUNTER — OFFICE VISIT (OUTPATIENT)
Dept: UROLOGY | Facility: CLINIC | Age: 68
End: 2023-06-22
Payer: OTHER GOVERNMENT

## 2023-06-22 VITALS
DIASTOLIC BLOOD PRESSURE: 78 MMHG | SYSTOLIC BLOOD PRESSURE: 157 MMHG | RESPIRATION RATE: 18 BRPM | WEIGHT: 130 LBS | HEART RATE: 65 BPM | HEIGHT: 66 IN | BODY MASS INDEX: 20.89 KG/M2

## 2023-06-22 DIAGNOSIS — C61 PROSTATE CANCER: Primary | ICD-10-CM

## 2023-06-22 PROCEDURE — 99214 PR OFFICE/OUTPT VISIT, EST, LEVL IV, 30-39 MIN: ICD-10-PCS | Mod: S$GLB,,, | Performed by: UROLOGY

## 2023-06-22 PROCEDURE — 99214 OFFICE O/P EST MOD 30 MIN: CPT | Mod: S$GLB,,, | Performed by: UROLOGY

## 2023-06-22 RX ORDER — ATORVASTATIN CALCIUM 20 MG/1
10 TABLET, FILM COATED ORAL
COMMUNITY
Start: 2023-03-27

## 2023-06-22 RX ORDER — LISINOPRIL 2.5 MG/1
TABLET ORAL
COMMUNITY
Start: 2023-03-28

## 2023-06-22 RX ORDER — ASCORBIC ACID 500 MG
500 TABLET ORAL
COMMUNITY
Start: 2023-03-27

## 2023-06-22 RX ORDER — SILDENAFIL 50 MG/1
TABLET, FILM COATED ORAL
COMMUNITY
Start: 2023-05-04

## 2023-06-22 NOTE — PROGRESS NOTES
"Subjective:       Patient ID: Kemal Chu is a 68 y.o. male.    Chief Complaint: Prostate Cancer      HPI:  68-year-old male who was initially followed for low risk prostate cancer who was on accident active surveillance he had a repeat biopsy back in the end of 2022 this showed progression of disease 3 cores of Ranjit 7 as well as a core of [place] 6 he was encouraged very strongly at that time that he needed curative treatment his PSA was 17.  He is here today his PSA most recently is now 27 he is here for discussion on his prostate cancer    Past Medical History:   Past Medical History:   Diagnosis Date    Anemia     Diabetes mellitus     Hyperlipidemia     Hypertension     Prostate cancer        Past Surgical Historical:   Past Surgical History:   Procedure Laterality Date    FINGER AMPUTATION          Medications:   Medication List with Changes/Refills   Current Medications    ASCORBIC ACID, VITAMIN C, (VITAMIN C) 500 MG TABLET    500 mg.    ASPIRIN 81 MG CHEW    81 mg.    ATORVASTATIN (LIPITOR) 20 MG TABLET    10 mg.    LISINOPRIL (PRINIVIL,ZESTRIL) 2.5 MG TABLET        METFORMIN (GLUCOPHAGE-XR) 500 MG ER 24HR TABLET        MULTIVIT WITH MINERALS/LUTEIN (MULTIVITAMIN 50 PLUS ORAL)    TAKE 1 TABLET BY MOUTH DAILY AS A VITAMIN SUPPLEMENT    SILDENAFIL (VIAGRA) 50 MG TABLET            Past Social History:   Social History     Socioeconomic History    Marital status:    Occupational History    Occupation:    Tobacco Use    Smoking status: Never    Smokeless tobacco: Never   Substance and Sexual Activity    Alcohol use: Not Currently     Comment: quit     Drug use: Not Currently     Types: "Crack" cocaine, Marijuana     Comment: quit 2004       Allergies: Review of patient's allergies indicates:  No Known Allergies     Family History:   Family History   Problem Relation Age of Onset    No Known Problems Father     No Known Problems Mother         Review of Systems:  Review of Systems "   Constitutional:  Negative for activity change and appetite change.   HENT:  Negative for congestion and dental problem.    Eyes:  Negative for visual disturbance.   Respiratory:  Negative for chest tightness and shortness of breath.    Cardiovascular:  Negative for chest pain.   Gastrointestinal:  Negative for abdominal distention and abdominal pain.   Endocrine: Negative for polyuria.   Genitourinary:  Negative for difficulty urinating, dysuria, flank pain, frequency, hematuria, penile discharge, penile pain, penile swelling, scrotal swelling, testicular pain and urgency.   Musculoskeletal:  Negative for back pain and neck pain.   Skin:  Negative for color change.   Allergic/Immunologic: Positive for immunocompromised state.   Neurological:  Negative for dizziness.   Hematological:  Negative for adenopathy.   Psychiatric/Behavioral:  Negative for agitation, behavioral problems and confusion.      Physical Exam:  Physical Exam  Constitutional:       General: He is not in acute distress.     Appearance: He is well-developed.   HENT:      Head: Normocephalic and atraumatic.      Nose: Nose normal.   Eyes:      General: No scleral icterus.     Conjunctiva/sclera: Conjunctivae normal.      Pupils: Pupils are equal, round, and reactive to light.   Neck:      Thyroid: No thyromegaly.      Trachea: No tracheal deviation.   Cardiovascular:      Rate and Rhythm: Normal rate and regular rhythm.      Heart sounds: Normal heart sounds.   Pulmonary:      Effort: Pulmonary effort is normal. No respiratory distress.      Breath sounds: Normal breath sounds. No wheezing or rales.   Abdominal:      General: Bowel sounds are normal. There is no distension.      Palpations: Abdomen is soft.      Tenderness: There is no abdominal tenderness. There is no guarding or rebound.   Genitourinary:     Penis: Normal. No tenderness.       Prostate: Normal.   Musculoskeletal:         General: No deformity. Normal range of motion.      Cervical  back: Neck supple.   Lymphadenopathy:      Cervical: No cervical adenopathy.   Skin:     General: Skin is warm and dry.      Findings: No erythema or rash.   Neurological:      Mental Status: He is alert and oriented to person, place, and time.      Cranial Nerves: No cranial nerve deficit.   Psychiatric:         Behavior: Behavior normal.       Assessment/Plan:       Problem List Items Addressed This Visit          Oncology    Prostate cancer - Primary            68-year-old male with progressing prostate cancer 3 cores of Ranjit 7.  His PSA is now 27:   I would a very long and vishal discussion with the patient he is resistant to intervention for some reason I have expressed to him very plane terms that the window of opportunity for him to be cured of his prostate cancer his rapidly closing he needs to immediately seek curative treatment he lives in [place] I will set him up with external beam radiation and [place] patient states he is agreeable

## 2023-07-24 ENCOUNTER — PATIENT MESSAGE (OUTPATIENT)
Dept: RESEARCH | Facility: HOSPITAL | Age: 68
End: 2023-07-24
Payer: OTHER GOVERNMENT

## 2023-07-31 ENCOUNTER — PATIENT MESSAGE (OUTPATIENT)
Dept: RESEARCH | Facility: HOSPITAL | Age: 68
End: 2023-07-31
Payer: OTHER GOVERNMENT